# Patient Record
Sex: FEMALE | Race: BLACK OR AFRICAN AMERICAN | HISPANIC OR LATINO | Employment: PART TIME | URBAN - METROPOLITAN AREA
[De-identification: names, ages, dates, MRNs, and addresses within clinical notes are randomized per-mention and may not be internally consistent; named-entity substitution may affect disease eponyms.]

---

## 2017-04-25 ENCOUNTER — ALLSCRIPTS OFFICE VISIT (OUTPATIENT)
Dept: OTHER | Facility: OTHER | Age: 22
End: 2017-04-25

## 2017-04-25 ENCOUNTER — LAB REQUISITION (OUTPATIENT)
Dept: LAB | Facility: HOSPITAL | Age: 22
End: 2017-04-25
Payer: COMMERCIAL

## 2017-04-25 DIAGNOSIS — Z11.3 ENCOUNTER FOR SCREENING FOR INFECTIONS WITH PREDOMINANTLY SEXUAL MODE OF TRANSMISSION: ICD-10-CM

## 2017-04-25 PROCEDURE — 87491 CHLMYD TRACH DNA AMP PROBE: CPT | Performed by: PHYSICIAN ASSISTANT

## 2017-04-25 PROCEDURE — 87591 N.GONORRHOEAE DNA AMP PROB: CPT | Performed by: PHYSICIAN ASSISTANT

## 2017-04-26 LAB
CHLAMYDIA DNA CVX QL NAA+PROBE: NORMAL
N GONORRHOEA DNA GENITAL QL NAA+PROBE: NORMAL

## 2017-05-02 ENCOUNTER — APPOINTMENT (OUTPATIENT)
Dept: PHYSICAL THERAPY | Facility: REHABILITATION | Age: 22
End: 2017-05-02
Payer: COMMERCIAL

## 2017-05-02 PROCEDURE — 97140 MANUAL THERAPY 1/> REGIONS: CPT

## 2017-05-02 PROCEDURE — 97162 PT EVAL MOD COMPLEX 30 MIN: CPT

## 2017-05-10 ENCOUNTER — ALLSCRIPTS OFFICE VISIT (OUTPATIENT)
Dept: OTHER | Facility: OTHER | Age: 22
End: 2017-05-10

## 2017-05-16 ENCOUNTER — APPOINTMENT (OUTPATIENT)
Dept: PHYSICAL THERAPY | Facility: REHABILITATION | Age: 22
End: 2017-05-16
Payer: COMMERCIAL

## 2017-05-18 ENCOUNTER — ALLSCRIPTS OFFICE VISIT (OUTPATIENT)
Dept: OTHER | Facility: OTHER | Age: 22
End: 2017-05-18

## 2017-05-22 ENCOUNTER — APPOINTMENT (OUTPATIENT)
Dept: PHYSICAL THERAPY | Facility: REHABILITATION | Age: 22
End: 2017-05-22
Payer: COMMERCIAL

## 2017-05-23 ENCOUNTER — APPOINTMENT (OUTPATIENT)
Dept: PHYSICAL THERAPY | Facility: REHABILITATION | Age: 22
End: 2017-05-23
Payer: COMMERCIAL

## 2017-05-30 ENCOUNTER — APPOINTMENT (OUTPATIENT)
Dept: PHYSICAL THERAPY | Facility: REHABILITATION | Age: 22
End: 2017-05-30
Payer: COMMERCIAL

## 2017-06-12 DIAGNOSIS — Z00.00 ENCOUNTER FOR GENERAL ADULT MEDICAL EXAMINATION WITHOUT ABNORMAL FINDINGS: ICD-10-CM

## 2017-06-12 DIAGNOSIS — B16.9 ACUTE VIRAL HEPATITIS B WITHOUT COMA AND WITHOUT DELTA AGENT: ICD-10-CM

## 2017-06-13 ENCOUNTER — APPOINTMENT (OUTPATIENT)
Dept: LAB | Facility: HOSPITAL | Age: 22
End: 2017-06-13
Payer: COMMERCIAL

## 2017-06-13 ENCOUNTER — TRANSCRIBE ORDERS (OUTPATIENT)
Dept: LAB | Facility: HOSPITAL | Age: 22
End: 2017-06-13

## 2017-06-13 DIAGNOSIS — Z00.00 ENCOUNTER FOR GENERAL ADULT MEDICAL EXAMINATION WITHOUT ABNORMAL FINDINGS: ICD-10-CM

## 2017-06-13 DIAGNOSIS — B16.9 ACUTE VIRAL HEPATITIS B WITHOUT COMA AND WITHOUT DELTA AGENT: ICD-10-CM

## 2017-06-13 LAB
HAV IGM SER QL: NORMAL
HBV CORE IGM SER QL: NORMAL
HBV SURFACE AG SER QL: NORMAL
HCV AB SER QL: NORMAL

## 2017-06-13 PROCEDURE — 87389 HIV-1 AG W/HIV-1&-2 AB AG IA: CPT

## 2017-06-13 PROCEDURE — 36415 COLL VENOUS BLD VENIPUNCTURE: CPT

## 2017-06-13 PROCEDURE — 80074 ACUTE HEPATITIS PANEL: CPT

## 2017-06-14 LAB — HIV 1+2 AB+HIV1 P24 AG SERPL QL IA: NORMAL

## 2017-07-12 ENCOUNTER — GENERIC CONVERSION - ENCOUNTER (OUTPATIENT)
Dept: OTHER | Facility: OTHER | Age: 22
End: 2017-07-12

## 2017-07-19 DIAGNOSIS — B16.9 ACUTE VIRAL HEPATITIS B WITHOUT COMA AND WITHOUT DELTA AGENT: ICD-10-CM

## 2017-07-19 DIAGNOSIS — Z00.00 ENCOUNTER FOR GENERAL ADULT MEDICAL EXAMINATION WITHOUT ABNORMAL FINDINGS: ICD-10-CM

## 2017-09-08 ENCOUNTER — APPOINTMENT (OUTPATIENT)
Dept: PHYSICAL THERAPY | Facility: REHABILITATION | Age: 22
End: 2017-09-08
Payer: COMMERCIAL

## 2017-09-08 PROCEDURE — 97162 PT EVAL MOD COMPLEX 30 MIN: CPT

## 2017-09-14 ENCOUNTER — APPOINTMENT (OUTPATIENT)
Dept: PHYSICAL THERAPY | Facility: REHABILITATION | Age: 22
End: 2017-09-14
Payer: COMMERCIAL

## 2017-09-14 PROCEDURE — 97112 NEUROMUSCULAR REEDUCATION: CPT

## 2017-09-14 PROCEDURE — 97014 ELECTRIC STIMULATION THERAPY: CPT

## 2017-09-14 PROCEDURE — 97140 MANUAL THERAPY 1/> REGIONS: CPT

## 2017-09-15 ENCOUNTER — APPOINTMENT (OUTPATIENT)
Dept: PHYSICAL THERAPY | Facility: REHABILITATION | Age: 22
End: 2017-09-15
Payer: COMMERCIAL

## 2017-09-15 PROCEDURE — 97110 THERAPEUTIC EXERCISES: CPT

## 2017-09-15 PROCEDURE — 97014 ELECTRIC STIMULATION THERAPY: CPT

## 2017-09-15 PROCEDURE — 97112 NEUROMUSCULAR REEDUCATION: CPT

## 2017-09-15 PROCEDURE — 97140 MANUAL THERAPY 1/> REGIONS: CPT

## 2017-09-21 ENCOUNTER — APPOINTMENT (OUTPATIENT)
Dept: PHYSICAL THERAPY | Facility: REHABILITATION | Age: 22
End: 2017-09-21
Payer: COMMERCIAL

## 2017-09-21 PROCEDURE — 97112 NEUROMUSCULAR REEDUCATION: CPT

## 2017-09-21 PROCEDURE — 97140 MANUAL THERAPY 1/> REGIONS: CPT

## 2017-09-21 PROCEDURE — 97014 ELECTRIC STIMULATION THERAPY: CPT

## 2017-09-22 ENCOUNTER — APPOINTMENT (OUTPATIENT)
Dept: PHYSICAL THERAPY | Facility: REHABILITATION | Age: 22
End: 2017-09-22
Payer: COMMERCIAL

## 2017-09-28 ENCOUNTER — APPOINTMENT (OUTPATIENT)
Dept: PHYSICAL THERAPY | Facility: REHABILITATION | Age: 22
End: 2017-09-28
Payer: COMMERCIAL

## 2017-09-28 PROCEDURE — 97140 MANUAL THERAPY 1/> REGIONS: CPT

## 2017-09-28 PROCEDURE — 97110 THERAPEUTIC EXERCISES: CPT

## 2017-09-28 PROCEDURE — 97014 ELECTRIC STIMULATION THERAPY: CPT

## 2017-10-06 ENCOUNTER — APPOINTMENT (OUTPATIENT)
Dept: PHYSICAL THERAPY | Facility: REHABILITATION | Age: 22
End: 2017-10-06
Payer: COMMERCIAL

## 2017-10-06 PROCEDURE — 97014 ELECTRIC STIMULATION THERAPY: CPT

## 2017-10-06 PROCEDURE — 97140 MANUAL THERAPY 1/> REGIONS: CPT

## 2017-10-06 PROCEDURE — 97110 THERAPEUTIC EXERCISES: CPT

## 2017-10-06 PROCEDURE — 97112 NEUROMUSCULAR REEDUCATION: CPT

## 2017-10-13 ENCOUNTER — APPOINTMENT (OUTPATIENT)
Dept: PHYSICAL THERAPY | Facility: REHABILITATION | Age: 22
End: 2017-10-13
Payer: COMMERCIAL

## 2017-10-13 PROCEDURE — 97110 THERAPEUTIC EXERCISES: CPT

## 2017-10-13 PROCEDURE — 97014 ELECTRIC STIMULATION THERAPY: CPT

## 2017-10-13 PROCEDURE — 97140 MANUAL THERAPY 1/> REGIONS: CPT

## 2017-10-20 ENCOUNTER — APPOINTMENT (OUTPATIENT)
Dept: PHYSICAL THERAPY | Facility: REHABILITATION | Age: 22
End: 2017-10-20
Payer: COMMERCIAL

## 2017-10-20 PROCEDURE — G8990 OTHER PT/OT CURRENT STATUS: HCPCS

## 2017-10-20 PROCEDURE — 97110 THERAPEUTIC EXERCISES: CPT

## 2017-10-20 PROCEDURE — G8991 OTHER PT/OT GOAL STATUS: HCPCS

## 2017-12-06 ENCOUNTER — GENERIC CONVERSION - ENCOUNTER (OUTPATIENT)
Dept: OTHER | Facility: OTHER | Age: 22
End: 2017-12-06

## 2018-01-11 NOTE — MISCELLANEOUS
Message  pt called stating " i woke up with sam hics cxtns last night -had 3 in 30 minutes-none after that & none now" encouraged pt to increase water intake- advised to drink 6-8 glasses/water daily  she also has GI viral symptoms- ie: diarrhea pt advised BRAT diet today & reasses symptoms after 24 hrs- gradually increase diet as tolerated  -denies LOF, vag blding, states + FM      Active Problems    1  Environmental allergies (V15 09) (Z91 09)   2  Need for diphtheria-tetanus-pertussis (Tdap) vaccine (V06 1) (Z23)   3  Need for immunization against influenza (V04 81) (Z23)   4  Obesity during pregnancy in second trimester (649 13,278 00) (H78 340)   5  Pregnancy (V22 2) (Z33 1)   6  Pregnancy, supervision of first, second trimester (V22 0) (Z34 02)   7  Johanne-Parkinson-White syndrome (426 7) (I45 6)    Current Meds   1  Coconut Oil OIL; Therapy: (Recorded:11Mar2016) to Recorded   2  Prenatal 27-0 8 MG Oral Tablet; TAKE 1 TABLET DAILY AS DIRECTED; Therapy: 16Uqb2645 to (Evaluate:48Fun2895); Last Rx:55Idj8192 Ordered   3  Tdap (Boostrix) (Tdap (Boostrix)); INJECT 0 5 ML Once; Therapy: 77YJO5110 to (Last Rx:38Gdr7538) Ordered    Allergies    1  No Known Drug Allergies    2  No Known Environmental Allergies   3   No Known Food Allergies    Signatures   Electronically signed by : Breanna Huston RN; Mar 18 2016 11:59AM EST                       (Author)

## 2018-01-11 NOTE — MISCELLANEOUS
Message  telephone call from patient  States she has not felt baby move this morning--only 1 kick  Instructed to go L&D immediately      Active Problems    1  Bacterial vaginosis (616 10,041 9) (N76 0,B96 89)   2  Environmental allergies (V15 09) (Z91 09)   3  Maternal arrhythmia affecting pregnancy in third trimester, antepartum (441 56,737 2)   (O99 413,I49 9)   4  Need for diphtheria-tetanus-pertussis (Tdap) vaccine (V06 1) (Z23)   5  Need for immunization against influenza (V04 81) (Z23)   6  Obesity during pregnancy in second trimester (649 13,278 00) (S29 120)   7  Palpitations (785 1) (R00 2)   8  Pregnancy (V22 2) (Z33 1)   9  Pregnancy, supervision of first, second trimester (V22 0) (Z34 02)   10  Proteinuria (791 0) (R80 9)   11  Johanne-Parkinson-White syndrome (426 7) (I45 6)    Current Meds   1  Coconut Oil OIL; Therapy: (Recorded:11Mar2016) to Recorded   2  Magnesium Oxide 400 MG Oral Tablet; TAKE 1 TABLET DAILY; Therapy: 89SFG7448 to (Evaluate:41Gop1494)  Requested for: 28Mar2016; Last   Rx:00Urc9687 Ordered   3  Metoprolol Succinate ER 25 MG Oral Tablet Extended Release 24 Hour; Take 1 tablet   daily; Therapy: 91Cnt7361 to (Evaluate:22Did5215)  Requested for: 80Xob5904; Last   Rx:63Lxw9602 Ordered   4  Prenatal 27-0 8 MG Oral Tablet; TAKE 1 TABLET DAILY AS DIRECTED; Therapy: 34Cda1688 to (Evaluate:56Ghx2561); Last Rx:28Ysd6735 Ordered    Allergies    1  No Known Drug Allergies    2  No Known Environmental Allergies   3   No Known Food Allergies    Signatures   Electronically signed by : Jamir Murray RN; May  5 2016 12:28PM EST                       (Author)

## 2018-01-13 VITALS
SYSTOLIC BLOOD PRESSURE: 132 MMHG | DIASTOLIC BLOOD PRESSURE: 80 MMHG | WEIGHT: 159 LBS | BODY MASS INDEX: 27.14 KG/M2 | HEIGHT: 64 IN

## 2018-01-13 VITALS
HEART RATE: 72 BPM | DIASTOLIC BLOOD PRESSURE: 68 MMHG | TEMPERATURE: 98 F | BODY MASS INDEX: 27.06 KG/M2 | SYSTOLIC BLOOD PRESSURE: 110 MMHG | HEIGHT: 64 IN | WEIGHT: 158.51 LBS

## 2018-01-13 NOTE — MISCELLANEOUS
Message  Telephone call from patient  States that condom broke last night during intercourse  Requesting PlanB  Rx  sent with instructions to take ASAP      Active Problems    1  Bacterial vaginosis (616 10,041 9) (N76 0,B96 89)   2  Contraception management (V25 9) (Z30 9)   3  Environmental allergies (V15 09) (Z91 09)   4  Palpitations (785 1) (R00 2)   5  Pelvic pain in female (625 9) (R10 2)   6  Proteinuria (791 0) (R80 9)   7  Unprotected sexual intercourse (V69 2) (Z72 51)   8  Johanne-Parkinson-White syndrome (426 7) (I45 6)    Current Meds   1  Coconut Oil OIL; Therapy: (Recorded:11Mar2016) to Recorded   2  MetroNIDAZOLE 500 MG Oral Tablet; TAKE 1 TABLET TWICE DAILY UNTIL FINISHED; Therapy: 71GWP2003 to (Evaluate:12Qjt6104)  Requested for: 17Aug2016; Last   Rx:82Yzr0562 Ordered   3  Prenatal 27-0 8 MG Oral Tablet; TAKE 1 TABLET DAILY AS DIRECTED; Therapy: 62Zaj0033 to (Evaluate:04Gvh7450); Last Rx:58Qis4382 Ordered    Allergies    1  No Known Drug Allergies    2  No Known Environmental Allergies   3   No Known Food Allergies    Plan  SocHx: Unprotected sexual intercourse    · Levonorgestrel 1 5 MG Oral Tablet (Plan B One-Step); TAKE 1 TABLET ONCE    Signatures   Electronically signed by : Haley Rankin RN; Sep  2 2016 10:05AM EST                       (Author)

## 2018-01-13 NOTE — PROGRESS NOTES
Assessment    1  Pelvic pain in female (625 9) (R10 2)   2  Bacterial vaginosis (616 10,041 9) (N76 0,B96 89)   3  Contraception management (V25 9) (Z30 9)    Plan  Bacterial vaginosis    · MetroNIDAZOLE 500 MG Oral Tablet; TAKE 1 TABLET TWICE DAILY UNTIL  FINISHED   Rx By: Bessie Jensen; Dispense: 7 Days ; #:14 Tablet; Refill: 1; For: Bacterial vaginosis; EMILY = N; Verified Transmission to [place] [address] ; Last Updated By: System, [employer]; 2016 9:29:10 AM   · Marcelino Azraraudel Mount- POC; Status:Complete;   Done: 97QIE2725 09:28AM   Performed: In Office; YSS:90OWG8359;YZEDKDN; Today; For:Bacterial vaginosis; Ordered By:Eloina Perez;  Pelvic pain in female    · (1) CHLAMYDIA/GC AMPLIFIED DNA, PCR; Source:Endocervical; Status:Active; Requested for:02Uvg9760;    Perform:St  [address] [place] [address]; [place]; For:Pelvic pain in female; Ordered By:Fredis Perez;    Discussion/Summary  Discussion Summary:   BV: Take medication as ordered  No sex prior to follow up in one wk  If pain persists consider PID treatment; not treating today as PID due to mild symptoms and few WBC on wet mount  Call office if pain worsens  GC/CT culture done  Contraception options discussed  Pt  considering Paragard again; advised pelvic would need to be resolved prior to IUD insertion  Also advised unlikely that Mirena would cause increased cramping like the Paragard did  Further discuss Paragard at one wk  follow up  Chief Complaint  Chief Complaint Free Text Note Form: Patient is here for painful intercourse and lower pelvic pain  Patient states it started this month during her ovulation cycle  Pain is not constant  No odor or discharge  History of Present Illness  HPI: 24 yo  presents with new onset pelvic pain   16  LMP 2016, using condoms  Used Paragard in past for 6 yr  and had increased cramping but not bleeding  No longer breast feeding     Reports 5-6 days intermittent pelvic pain, lasts < hr , no trigger or alleviating, has not tried pain medication, 6 on 10 scale, crampy/ache pain  Also new onset dyspareunia  Pain location varies, but all lower abdominal/pelvic  No voiding or bowel changes, no n/v/f/c  No recent strenuous activity  Current partner for over a year  Active Problems    1  Encounter for postpartum assessment (V24 2) (Z39 2)   2  Environmental allergies (V15 09) (Z91 09)   3  Maternal arrhythmia affecting pregnancy in third trimester, antepartum (586 86,980 8)   (O99 413,I49 9)   4  Need for diphtheria-tetanus-pertussis (Tdap) vaccine (V06 1) (Z23)   5  Need for immunization against influenza (V04 81) (Z23)   6  Obesity during pregnancy in second trimester (649 13,278 00) (S86 103)   7  Palpitations (785 1) (R00 2)   8  Pregnancy (V22 2) (Z33 1)   9  Pregnancy, supervision of first, second trimester (V22 0) (Z34 02)   10  Proteinuria (791 0) (R80 9)   11  Johanne-Parkinson-White syndrome (426 7) (I45 6)    Past Medical History    1  History of Abdominal pain, RLQ (right lower quadrant) (789 03) (R10 31)   2  History of Acute PID (pelvic inflammatory disease) (614 3) (N73 0)   3  History of Attention Deficit Disorder Without Hyperactivity (314 00)   4  History of Bacterial vaginosis (616 10,041 9) (N76 0,B96 89)   5  History of allergic reaction (V15 09) (Z88 9)   6  History of dizziness (V13 89) (Z87 898)   7  History of fatigue (V13 89) (Z87 898)   8  History of upper respiratory infection (V12 09) (Z87 09)   9  History of urinary tract infection (V13 02) (Z87 440)   10  History of Injury, hand (959 4) (S69 90XA)   11  History of Screening for chlamydial disease (V73 98) (Z11 8)   12  History of Screening for gonorrhea (V74 5) (Z11 3)   13  History of Skin rash (782 1) (R21)   14  History of Strain of thoracic region (847 1) (S29 019A)   15  History of Thoracic back pain (724 1) (M54 6)   16   History of Trapezius muscle spasm (726 85) (O84 556)   17  History of Vitamin D deficiency (268 9) (E55 9)    Surgical History    1  History of Hand Surgery    Family History  Mother    1  Family history of Depression   2  Family history of Diabetes Mellitus (V18 0)   3  Family history of HIV Infection   4  Family history of Nephrolithiasis   5  Family history of Thyroid Disorder (V18 19)  Brother    6  Family history of Diabetes Mellitus (V18 0)   7  Family history of Diabetic Peripheral Neuropathy Type 2 - Uncontrolled  Grandmother    8  Family history of CAD (coronary artery disease)    Social History    · Denied: History of Alcohol Use (History)   · Former smoker (V15 82) (T43 764)   · History of Former smoker (V15 82) (Z87 891)   · Home Environment (V60 9)   · Marital History - Single   · Not currently sexually active   · History of Using Marijuana    Current Meds   1  Coconut Oil OIL; Therapy: (Recorded:11Mar2016) to Recorded   2  Prenatal 27-0 8 MG Oral Tablet; TAKE 1 TABLET DAILY AS DIRECTED; Therapy: 98Kxf9192 to (Evaluate:84Hno8909); Last Rx:03Sep2015 Ordered    Allergies    1  No Known Drug Allergies    2  No Known Environmental Allergies   3  No Known Food Allergies    Vitals  Vital Signs    Recorded: 62CAB4989 00:28QP   Systolic 161   Diastolic 77   Height 5 ft 4 in   Weight 173 lb    BMI Calculated 29 7   BSA Calculated 1 84     Physical Exam    Constitutional   General appearance: No acute distress, well appearing and well nourished  Genitourinary   External genitalia: Normal and no lesions appreciated  Vagina: Abnormal   (moderate, odorous white discharge)   Cervix: Abnormal   (very mild CMT)   Uterus: Abnormal   (mild tenderness with palpation) The uterus was tender, but in a normal position  Adnexa/parametria: Abnormal   (mild tenderness, L>R) Adnexa Findings: there were no adnexal masses        Results/Data  [facility] 55SPA7089 09:28AM Haider Mean   few wbc     Test Name Result Flag Reference   BACTERIA moderate CLUE CELL moderate     TRICHOMONAS 0     YEAST WITH HYPHAE 0     YEAST 0     PH UA 5 0     KOH PREP      pos  whiff, no hyphae       Future Appointments    Date/Time Provider Specialty Site   08/24/2016 09:45 AM [address] [facility], [facility]  [facility] [address]     Signatures   Electronically signed by : Kym Coy;  Aug 17 2016 11:19AM EST                       (Author)    Electronically signed by : Mortimer Mathieu, MD; Sep  8 2016  9:45AM EST

## 2018-01-14 VITALS
WEIGHT: 156 LBS | BODY MASS INDEX: 26.63 KG/M2 | DIASTOLIC BLOOD PRESSURE: 80 MMHG | HEIGHT: 64 IN | SYSTOLIC BLOOD PRESSURE: 122 MMHG

## 2018-01-15 NOTE — PROGRESS NOTES
2016         RE: [place]                                  To: [facility]   MR#: [id number]                                    [address] [place] [address]   : [address] [facility] [address]   ENC: [id number]                             Fax: [phone]   (Exam #: [id number])      The LMP of this 23year old,  1, para 0 patient was AUG 2 2015,   giving her an ARISTEO of MAY 8 2016 and a current gestational age of 27 weeks   5 days by dates  A sonographic examination was performed on 2016   using real time equipment  The ultrasound examination was performed using   abdominal technique  The patient has a BMI of 33 5  Her blood pressure   today was 113/66  With the exception of occasional palpitations, Reji Werner has no complaints   today  She reports regular fetal movements and denies problems related to   hypertension, gestational diabetes,  labor, or vaginal bleeding  Cardiac motion was observed at 141 bpm       INDICATIONS      Interval growth assesment   obesity   maternal WPW      Exam Types      Level I      RESULTS      Fetus # 1 of 1   Vertex presentation   Fetal growth appeared normal   Placenta Location = Anterior   No placenta previa   Placenta Grade = I      MEASUREMENTS (* Included In Average GA)      AC              29 7 cm        33 weeks 5 days* (19%)   BPD              8 9 cm        36 weeks 1 day * (57%)   HC              31 7 cm        35 weeks 1 day * (29%)   Femur            6 6 cm        33 weeks 6 days* (27%)      HC/AC           1 07   FL/AC           0 22   FL/BPD          0 74   Ceph Index      0 82   EFW (Ac/Fl/Hc)  2348 grams - 5 lbs 3 oz                 (19%)      THE AVERAGE GESTATIONAL AGE is 34 weeks 5 days +/- 21 days        AMNIOTIC FLUID      Q1: 4 6      Q2: 1 2      Q3: 0 2      Q4: 2 2   DARNELL Total = 8 2 cm   Amniotic Fluid: Normal      BIOPHYSICAL PROFILE      The Biophysical Profile score was 8/8  Breathin  Movement: 2  Tone: 2  AFV: 2      IMPRESSION      Stringer IUP   34 weeks and 5 days by this ultrasound  (ARISTEO=MAY 15 2016)   Vertex presentation   Fetal growth appeared normal   Regular fetal heart rate of 141 bpm   Anterior placenta   No placenta previa      GENERAL COMMENT      No fetal structural abnormality is identified on the Level I survey today  The fetal brain, heart including four-chamber, 3 vessel tracheal, and   outflow tract views, stomach, kidneys, bladder, three vessel cord, and   diaphragm appear normal   Fetal interval growth and amniotic fluid volume   are normal       Today's ultrasound findings and suggested follow-up were discussed in   detail with Maxim  Daily third trimester fetal kick counting was   discussed at the visit today  No further appointment has been scheduled in   the [facility]  for [facility] at this time  Follow-up [facility] ultrasound   evaluation is recommended as clinically indicated  The face to face time, in addition to time spent discussing ultrasound   results, was 10 minutes, greater than 50% of which was spent during   counseling and coordination of care  AYESHA Gastelum M D     Maternal-Fetal Medicine   Electronically signed 16 09:33

## 2018-01-15 NOTE — MISCELLANEOUS
Message  Telephone call from patient  C/O increase in vaginal discharge,denies itching or odor  Instructed to wait/watch call back if odor itching, etc   Also c/o "strong contraction" last night  Denies juanita presently  Denies bleeding or loss of fluid  States normal fetal movement  Instructed to increase fluid intake  Pelvic rest  PTL precautions reviewed  To call back with any changes, questions or concerns      Active Problems    1  Environmental allergies (V15 09) (Z91 09)   2  Need for diphtheria-tetanus-pertussis (Tdap) vaccine (V06 1) (Z23)   3  Need for immunization against influenza (V04 81) (Z23)   4  Obesity during pregnancy in second trimester (649 13,278 00) (Q69 304)   5  Pregnancy (V22 2) (Z33 1)   6  Pregnancy, supervision of first, second trimester (V22 0) (Z34 02)   7  Johanne-Parkinson-White syndrome (426 7) (I45 6)    Current Meds   1  Coconut Oil OIL; Therapy: (Recorded:11Mar2016) to Recorded   2  Prenatal 27-0 8 MG Oral Tablet; TAKE 1 TABLET DAILY AS DIRECTED; Therapy: 62Hwu4691 to (Evaluate:99Squ6983); Last Rx:25Enr2025 Ordered   3  Tdap (Boostrix) (Tdap (Boostrix)); INJECT 0 5 ML Once; Therapy: 13TXY9617 to (Last Rx:48Fsn4849) Ordered    Allergies    1  No Known Drug Allergies    2  No Known Environmental Allergies   3   No Known Food Allergies    Signatures   Electronically signed by : Butch Chaudhary RN; Mar 16 2016 12:59PM EST                       (Author)

## 2018-01-16 NOTE — PROGRESS NOTES
MAR 11 2016         RE: Kailey Segovia                                  To: [facility]   MR#: [id number]                                    [address] [facility]   : [address] [facility], [address]   ENC: [id number]                             Fax: [phone]   (Exam #: [id number])      The LMP of this 23year old,  1, para 0 patient was AUG 2 2015,   giving her an ARISTEO of MAY 8 2016 and a current gestational age of 34 weeks   5 days by dates  A sonographic examination was performed on MAR 11 2016   using real time equipment  The ultrasound examination was performed using   abdominal technique  The patient has a BMI of 32 8  Her blood pressure   today was 133/65  Cardiac motion was observed at 146 bpm       INDICATIONS      Interval growth assesment   obesity   maternal medical condition unspecified      Exam Types      Level I      RESULTS      Fetus # 1 of 1   Vertex presentation   Fetal growth appeared normal   Placenta Location = Anterior   No placenta previa   Placenta Grade = II      MEASUREMENTS (* Included In Average GA)      AC              25 3 cm        29 weeks 4 days* (9%)   BPD              8 2 cm        32 weeks 6 days* (64%)   HC              28 7 cm        31 weeks 1 day * (23%)   Femur            5 9 cm        30 weeks 4 days* (29%)      Cerebellum       4 1 cm        34 weeks 0 days      HC/AC           1 13   FL/AC           0 23   FL/BPD          0 72   EFW (Ac/Fl/Hc)  1528 grams - 3 lbs 6 oz                 (25%)      THE AVERAGE GESTATIONAL AGE is 31 weeks 0 days +/- 18 days        AMNIOTIC FLUID      Q1: 3 5      Q2: 5 1      Q3: 2 7      Q4: 3 4   DARNELL Total = 14 7 cm   Amniotic Fluid: Normal      FETAL VESSELS                                     S/D   PI    RI    PSV   AEDV RF                                                    cm/s       Umbilical Artery           2 03  0 70  0 51        No   No       Middle Cerebral Artery     8 79  2 36  0 89        No      ANATOMY DETAILS      Visualized Appearing Sonographically Normal:   HEAD: (Calvarium, BPD Level, Lateral Ventricles, Choroid Plexus,   Cerebellum); HEART: (Four Chamber View, Proximal Left Outflow, Proximal   Right Outflow, Cardiac Axis, Interventricular Septum, Interatrial Septum,   Cardiac Position);    STOMACH, RIGHT KIDNEY, LEFT KIDNEY, BLADDER, PLACENTA      Suboptimally Visualized:   HEAD: (Cisterna Magna)      BIOPHYSICAL PROFILE      The Biophysical Profile score was 8/8  Breathin  Movement: 2  Tone: 2  AFV: 2      IMPRESSION      Stringer IUP   31 weeks and 0 days by this ultrasound  (ARISTEO=MAY 13 2016)   Vertex presentation   Fetal growth appeared normal   Regular fetal heart rate of 146 bpm   Anterior placenta   No placenta previa      GENERAL COMMENT      On exam today the patient appears well, in no acute distress, and denies   any complaints  Her abdomen is non-tender  There has been appropriate interval fetal growth  Good fetal movement and   tone are seen  The amniotic fluid volume appears normal   The placenta is   anterior and it appears sonographically normal   Fetal Dopplers are normal   for gestational age  The anatomic structures listed above could not be   optimally imaged today because of fetal position; however, these   structures were seen on a prior ultrasound and appeared sonographically   normal   The patient was informed of today's findings and all of her   questions were answered  The limitations of ultrasound were reviewed with   the patient, which she accepts   labor precautions were discussed   and the patient was encouraged to contact her Obstetrician should she   experience any signs or symptoms of  labor  Fetal movement   assessment was reviewed with the patient  The patient verbalized her   understanding of these instructions  Recommend further ultrasounds as clinically indicated  Please note, in addition to the time spent discussing the results of the   ultrasound, I spent approximately 10 minutes of face-to-face time with the   patient, greater than 50% of which was spent in counseling and the   coordination of care for this patient  Thank you very much for allowing us to participate in the care of this   very nice patient  Should you have any questions, please do not hesitate   to contact our office  AYESHA Us , AZAR Costello     Electronically signed 03/19/16 08:09

## 2018-01-16 NOTE — MISCELLANEOUS
Message  returned call to patient  C/O one episode of left- sided abdominal pain radiating to back and leg 2 days prior  None since and currently denies pain  States fetal movement is normal, denies bleeding, loss of fluid or dysuria  Instructed to wait/watch  Increase fluids  Call back if continues or change in any of the above       Active Problems    1  Environmental allergies (V15 09) (Z91 09)   2  Need for diphtheria-tetanus-pertussis (Tdap) vaccine (V06 1) (Z23)   3  Need for immunization against influenza (V04 81) (Z23)   4  Obesity during pregnancy in second trimester (649 13,278 00) (O99 212,E66 9)   5  Pregnancy (V22 2) (Z33 1)   6  Pregnancy, supervision of first, second trimester (V22 0) (Z34 02)   7  Johanne-Parkinson-White syndrome (426 7) (I45 6)    Current Meds   1  Prenatal 27-0 8 MG Oral Tablet; TAKE 1 TABLET DAILY AS DIRECTED; Therapy: 89Aox9233 to (Evaluate:08Ugz5317); Last Rx:36Cgb6779 Ordered   2  Tdap (Boostrix) (Tdap (Boostrix)); INJECT 0 5 ML Once; Therapy: 89JNA4095 to (Last Rx:81Axg6911) Ordered    Allergies    1  No Known Drug Allergies    2  No Known Environmental Allergies   3   No Known Food Allergies    Signatures   Electronically signed by : Ajith Bhatt RN; Feb 11 2016  9:09AM EST                       (Author)

## 2018-01-17 NOTE — MISCELLANEOUS
Provider Comments  Provider Comments:   pt n/s for her paraguard f/up      Signatures   Electronically signed by : Meal Mantramontserrat Alanis, ; Apr 5 2017 10:02AM EST                       (Author)

## 2018-01-17 NOTE — MISCELLANEOUS
Message  telephone call from patient  C/O contractions every 3-5 minutes lasting for several hours yesterday  Stopped then started again this morning  Also c/o slight pink vaginal discharge  Appointment given for [id number] today to evaluate      Active Problems    1  Bacterial vaginosis (616 10,041 9) (N76 0,B96 89)   2  Environmental allergies (V15 09) (Z91 09)   3  Need for diphtheria-tetanus-pertussis (Tdap) vaccine (V06 1) (Z23)   4  Need for immunization against influenza (V04 81) (Z23)   5  Obesity during pregnancy in second trimester (649 13,278 00) (F70 017)   6  Pregnancy (V22 2) (Z33 1)   7  Pregnancy, supervision of first, second trimester (V22 0) (Z34 02)   8  Johanne-Parkinson-White syndrome (426 7) (I45 6)    Current Meds   1  Coconut Oil OIL; Therapy: (Recorded:11Mar2016) to Recorded   2  Magnesium Oxide 400 MG Oral Tablet; TAKE 1 TABLET DAILY; Therapy: 18BZC0073 to (Evaluate:03Ccu3988)  Requested for: 28Mar2016; Last   Rx:28Mar2016 Ordered   3  Prenatal 27-0 8 MG Oral Tablet; TAKE 1 TABLET DAILY AS DIRECTED; Therapy: 82Eak9107 to (Evaluate:73Ouq0978); Last Rx:45Oed0546 Ordered   4  Probiotic Oral Capsule; USE AS DIRECTED; Therapy: 38HCR6089 to Recorded    Allergies    1  No Known Drug Allergies    2  No Known Environmental Allergies   3   No Known Food Allergies    Signatures   Electronically signed by : Carri Nixon RN; Mar 29 2016 11:28AM EST                       (Author)

## 2018-07-05 ENCOUNTER — APPOINTMENT (OUTPATIENT)
Dept: URGENT CARE | Age: 23
End: 2018-07-05
Payer: OTHER MISCELLANEOUS

## 2018-07-05 PROCEDURE — G0383 LEV 4 HOSP TYPE B ED VISIT: HCPCS | Performed by: PREVENTIVE MEDICINE

## 2018-07-05 PROCEDURE — 99284 EMERGENCY DEPT VISIT MOD MDM: CPT | Performed by: PREVENTIVE MEDICINE

## 2018-07-10 ENCOUNTER — APPOINTMENT (OUTPATIENT)
Dept: URGENT CARE | Age: 23
End: 2018-07-10
Payer: OTHER MISCELLANEOUS

## 2018-07-10 PROCEDURE — 99213 OFFICE O/P EST LOW 20 MIN: CPT | Performed by: PREVENTIVE MEDICINE

## 2019-02-21 ENCOUNTER — OFFICE VISIT (OUTPATIENT)
Dept: OBGYN CLINIC | Facility: CLINIC | Age: 24
End: 2019-02-21

## 2019-02-21 VITALS
SYSTOLIC BLOOD PRESSURE: 140 MMHG | HEIGHT: 64 IN | DIASTOLIC BLOOD PRESSURE: 63 MMHG | BODY MASS INDEX: 30.39 KG/M2 | WEIGHT: 178 LBS | HEART RATE: 100 BPM

## 2019-02-21 DIAGNOSIS — B96.89 BV (BACTERIAL VAGINOSIS): ICD-10-CM

## 2019-02-21 DIAGNOSIS — Z97.5 IUD CONTRACEPTION: Primary | ICD-10-CM

## 2019-02-21 DIAGNOSIS — N76.0 BV (BACTERIAL VAGINOSIS): ICD-10-CM

## 2019-02-21 PROCEDURE — 87210 SMEAR WET MOUNT SALINE/INK: CPT | Performed by: NURSE PRACTITIONER

## 2019-02-21 PROCEDURE — 99212 OFFICE O/P EST SF 10 MIN: CPT | Performed by: NURSE PRACTITIONER

## 2019-02-21 RX ORDER — METRONIDAZOLE 500 MG/1
500 TABLET ORAL EVERY 12 HOURS SCHEDULED
Qty: 14 TABLET | Refills: 0 | Status: SHIPPED | OUTPATIENT
Start: 2019-02-21 | End: 2019-02-28

## 2019-02-21 RX ORDER — COPPER 313.4 MG/1
1 INTRAUTERINE DEVICE INTRAUTERINE CONTINUOUS
COMMUNITY
End: 2020-04-29

## 2019-02-21 NOTE — PATIENT INSTRUCTIONS
Intrauterine Device   WHAT YOU NEED TO KNOW:   What is an intrauterine device? An intrauterine device (IUD) is a type of birth control that is inserted into your uterus  It is a small, flexible piece of plastic with a string on the end  It is inserted and removed by your healthcare provider  IUDs prevent sperm from reaching or fertilizing an egg  IUDs also prevent a fertilized egg from attaching to the uterus and developing into a fetus  What are the most common types of IUDs? · Copper: This type of IUD slowly releases a small amount of copper into your uterus  This IUD can remain in place for up to 10 years  · Hormone-releasing: This type of IUD slowly releases a small amount of progesterone into your uterus  Progesterone is a hormone that is made by your body to help control your periods  This IUD can remain in place for up to 5 years  What are the advantages of an IUD? · Effective: An IUD is 98% to 99% effective in preventing pregnancy  · Easily removable: The IUD can be removed if you decide to have a baby  You may be able to get pregnant as soon as the IUD is removed  · Immediate:  An IUD protects you from pregnancy right after it is inserted  · Convenient:  You do not have to stop sexual activity to insert it or worry about remembering to take your birth control pill  · Safe:  Copper IUDs are safer for some women than oral birth control pills  Examples include women who smoke or have a history of blood clots  · Health effects:  Hormone-releasing IUDs may decrease certain health problems  Examples include bleeding and cramping that happen with your monthly period  What are the risks of an IUD? · An IUD does not protect you from sexually transmitted infections  You may have cramps during the first weeks after you get the IUD  A copper IUD may cause your monthly period to be heavier or more painful  This is more common within the first 3 months after you get the IUD   You may need to have your IUD removed if your bleeding or pain becomes severe  You may have spotting between periods  · There is a small chance that you could get pregnant  Sometimes the IUD cannot be removed after you get pregnant  This increases your risk of a miscarriage or an ectopic pregnancy  Ectopic pregnancy is when the fertilized egg starts to grow somewhere other than your uterus  There is a small risk of an infection within the first 20 days after the IUD is placed  Infection can lead to pelvic inflammatory disease  This can cause infertility  Your uterus may tear when the IUD is inserted  The IUD may slip part or all of the way out of your uterus  How is the IUD inserted? · The IUD is usually inserted during your monthly period  This may help decrease the amount of discomfort you have during the procedure  It also helps ensure that you are not pregnant  You will be asked to lie down and place your feet in stirrups  Your healthcare provider will gently insert a speculum into your vagina  This is the same tool used during a pap smear  The speculum allows your healthcare provider to see inside your vagina to your cervix  The cervix is the opening of your uterus  · Your healthcare provider will clean your cervix with an antiseptic solution to prevent infection  You may be given numbing medicine  A long plastic tube is gently passed through your cervix and into your uterus  This tube has the IUD inside of it  The IUD is pushed out of the tube and into your uterus  You may have cramps as the IUD is inserted  The tube is removed after the IUD is in place  How can I make sure my IUD is still in place? An IUD has a string made of plastic thread  One to 2 inches of this string hangs into your vagina  You cannot see this string, and it will not cause problems when you have sex  Check your IUD string every 3 days for the first 3 months after it is inserted  After that, check the string after each monthly period   Do the following to check the placement of your IUD:  · Wash your hands with soap and warm water  Dry them with a clean towel  · Bend your knees and squat low to the ground  · Gently put your index finger inside your vagina  The cervix is at the top of the vagina and feels like the tip of your nose  Feel for the IUD string  Do not pull on the string  You should not be able to feel the firm plastic of the IUD itself  · Wash your hands after you check your IUD string  Where can I find more information? · [facility] [address] , [address] [place]  Phone: [phone] 749 - [phone]  Web Address: https://[employer]/  org  When should I contact my healthcare provider? · You think you are pregnant  · You bleed after you have sex  · You have pain during sex  · You cannot feel the IUD string, the string feels longer, or you feel the plastic of the IUD itself  · You have vaginal discharge that is green, yellow, or has a foul odor  · You have questions or concerns about your condition or care  When should I seek immediate care? · You have severe pain or bleeding during your period  · You have a fever and severe abdominal pain  CARE AGREEMENT:   You have the right to help plan your care  Learn about your health condition and how it may be treated  Discuss treatment options with your caregivers to decide what care you want to receive  You always have the right to refuse treatment  The above information is an  only  It is not intended as medical advice for individual conditions or treatments  Talk to your doctor, nurse or pharmacist before following any medical regimen to see if it is safe and effective for you  © 2017 [employer] Information is for End User's use only and may not be sold, redistributed or otherwise used for commercial purposes   All illustrations and images included in CareNotes® are the copyrighted property of [employer]  or Iain Lebron  Bacterial Vaginosis   WHAT YOU NEED TO KNOW:   What is bacterial vaginosis? Bacterial vaginosis (BV) is an infection in the vagina  It may cause vaginitis, which is irritation and inflammation of the vagina  What causes bacterial vaginosis? The cause of BV is not known  With BV, there is an imbalance in bacteria normally found in the vagina  Your risk for BV increases if you are sexually active  Your risk for BV also increases if you douche or have an intrauterine device (IUD)  What are the signs and symptoms of bacterial vaginosis? Some women have no symptoms  You may have the following:  · White, gray, or yellow vaginal discharge    · Vaginal discharge that smells like fish    · Itching or burning around the outside of your vagina  How is bacterial vaginosis diagnosed? Your healthcare provider will examine you and ask if you have other health conditions  He may need to take a sample of fluid from your vagina  This will be tested for the bacteria that causes BV  How is bacterial vaginosis treated? Antibiotics are given to kill the bacteria that cause BV  They may be given as a pill or as a cream to put in your vagina  Take or use as directed  What are the risks of bacterial vaginosis? If untreated, BV may spread and lead to serious infections in your uterus and fallopian tubes  This can make it more difficult to get pregnant  BV increases your risk for other sexually transmitted infections (STIs), such as chlamydia, gonorrhea, or HIV  How can I prevent bacterial vaginosis? · Keep your vaginal area clean and dry:  Wear underwear and pantyhose with a cotton crotch  Wipe from front to back after you urinate or have a bowel movement  After bathing, rinse soap from your vaginal area to decrease your risk for irritation  · Do not use products that cause irritation:  Always use unscented tampons or sanitary pads   Do not use feminine sprays, powders, or scented tampons because they may cause irritation and increase your risk of BV  Detergents and fabric softeners may also cause irritation  · Do not douche: This can cause an imbalance in healthy vaginal bacteria  · Use latex condoms: This helps prevent another infection and keeps your partner from getting the infection  When should I contact my healthcare provider? · Your symptoms come back or do not improve with treatment  · You have vaginal bleeding that is not your monthly period  · You have questions or concerns about your condition or care  CARE AGREEMENT:   You have the right to help plan your care  Learn about your health condition and how it may be treated  Discuss treatment options with your caregivers to decide what care you want to receive  You always have the right to refuse treatment  The above information is an  only  It is not intended as medical advice for individual conditions or treatments  Talk to your doctor, nurse or pharmacist before following any medical regimen to see if it is safe and effective for you  © 2017 [employer] Information is for End User's use only and may not be sold, redistributed or otherwise used for commercial purposes  All illustrations and images included in CareNotes® are the copyrighted property of [employer]  or [employer]  Metronidazole (By mouth)   Metronidazole (met-liberty-OLMAN-da-zole)  Treats bacterial infections  Brand Name(s): Flagyl, Flagyl 375   There may be other brand names for this medicine  When This Medicine Should Not Be Used: This medicine is not right for everyone  Do not use if you had an allergic reaction to metronidazole or similar medicines  Do not use this medicine to treat trichomoniasis if you are in the first 3 months of pregnancy    How to Use This Medicine:   Capsule, Tablet, Long Acting Tablet  · Take this medicine as directed, and take it at the same time each day   · Capsule or Tablet: Take with food or milk to avoid stomach upset  · Extended-release tablet: Take it on an empty stomach, 1 hour before or 2 hours after a meal   · Swallow the extended-release tablet whole  Do not crush, break, or chew it  · Take all of the medicine in your prescription to clear up your infection, even if you feel better after the first few doses  · Missed dose: Take a dose as soon as you remember  If it is almost time for your next dose, wait until then and take a regular dose  Do not take extra medicine to make up for a missed dose  · Store the medicine in a closed container at room temperature, away from heat, moisture, and direct light  Drugs and Foods to Avoid:   Ask your doctor or pharmacist before using any other medicine, including over-the-counter medicines, vitamins, and herbal products  · Do not use this medicine if you have taken disulfiram within the last 2 weeks  Do not drink alcohol or use medicine that contains alcohol or propylene glycol while using this medicine and for at least 3 days after you have finished metronidazole treatment  · Some foods and medicines can affect how metronidazole works  Tell your doctor if you are using busulfan, cimetidine, lithium, phenobarbital, phenytoin, or warfarin or another blood thinner  Warnings While Using This Medicine:   · Tell your doctor if you are pregnant or breastfeeding, or if you have kidney disease, liver disease, blood or bone marrow problems, oral thrush, yeast infection, or a history of seizures  · This medicine may cause the following problems:  ¨ Brain or nervous system problems, including seizures, meningitis, or vision problems  · Trichomoniasis treatment:  Your doctor may want to also treat your sexual partner, even if he or she has no symptoms  Also, you may want to use a condom during sexual intercourse  These measures will help keep you from getting the infection back again from your partner   If you have any questions, ask your doctor  · Call your doctor if your symptoms do not improve or if they get worse  · Your doctor will do lab tests at regular visits to check on the effects of this medicine  Keep all appointments  · Tell any doctor or dentist who treats you that you are using this medicine  This medicine may affect certain medical test results  · Keep all medicine out of the reach of children  Never share your medicine with anyone  Possible Side Effects While Using This Medicine:   Call your doctor right away if you notice any of these side effects:  · Allergic reaction: Itching or hives, swelling in your face or hands, swelling or tingling in your mouth or throat, chest tightness, trouble breathing  · Confusion, drowsiness, fever, headache, loss of appetite, nausea or vomiting, stiff neck or back  · Dizziness, problems with muscle control, clumsiness, shakiness, trouble talking  · Fever, chills, cough, sore throat, and body aches  · Numbness, tingling, or burning pain in your hands, arms, legs, or feet  · Seizures  If you notice these less serious side effects, talk with your doctor:   · Mild nausea  · Unusual or unpleasant taste in your mouth  If you notice other side effects that you think are caused by this medicine, tell your doctor  Call your doctor for medical advice about side effects  You may report side effects to FDA at 9-987-FDA-5919  © 2017 [employer] Information is for End User's use only and may not be sold, redistributed or otherwise used for commercial purposes  The above information is an  only  It is not intended as medical advice for individual conditions or treatments  Talk to your doctor, nurse or pharmacist before following any medical regimen to see if it is safe and effective for you

## 2019-02-21 NOTE — PROGRESS NOTES
Assessment/Plan:      Diagnoses and all orders for this visit:    IUD contraception    BV (bacterial vaginosis)  -     metroNIDAZOLE (FLAGYL) 500 mg tablet; Take 1 tablet (500 mg total) by mouth every 12 (twelve) hours for 7 days    Other orders  -     intrauterine copper (PARAGARD) IUD; 1 Device by Intrauterine route continuous      -reviewed with patient removal of device may not resolve symptoms of back pain and vaginal bleeding  Encouraged patient to treat infection 1st   Patient is agreeable  -reviewed bacterial vaginosis and metronidazole  Written information provided  -reviewed with patient she is due for annual exam with Pap smear  -POC-treat infection than annual exam   Patient agreeable to give 3-4 months after infection is treated to see if pain and abnormal bleeding have resolved  If ParaGard IUD is removed plans to use condoms for contraception    RTO annual exam     Subjective:     Patient ID: Karoline Cronin is a 20 y o  female  HPI    here with desire for ParaGard IUD removal   Believes ParaGard IUD is causing low back pain and vaginal bleeding  Patient complains of low back pain starting last month noticed this months menses was prolonged 8-10 days, typically 5-6 days  Patient also states she has had vaginal discharge, odor for approximately 3 months  Patient believes this is BV as she has had this in the past   Has not been treated  Patient recently seen in emergency department [facility] had pelvic ultrasound at that time IUD is in appropriate paced  Patient states she was satisfied prior to recent back pain and bleeding this month  Patient states had STI testing last month  Last Pap smear 10/2/15- [facility]    Review of Systems   Constitutional: Negative for chills and fever  Respiratory: Negative  Cardiovascular: Negative  Gastrointestinal: Negative  Genitourinary: Positive for menstrual problem, pelvic pain and vaginal discharge   Negative for decreased urine volume, difficulty urinating, dysuria, flank pain, frequency, hematuria and vaginal pain  Objective:     Physical Exam   Constitutional: She is oriented to person, place, and time  She appears well-developed and well-nourished  Cardiovascular: Normal rate, regular rhythm and normal heart sounds  Pulmonary/Chest: Effort normal and breath sounds normal    Abdominal: Soft  Bowel sounds are normal    Genitourinary: No labial fusion  There is no rash, tenderness, lesion or injury on the right labia  There is no rash, tenderness, lesion or injury on the left labia  No erythema, tenderness or bleeding in the vagina  No foreign body in the vagina  No signs of injury around the vagina  Genitourinary Comments: ParaGard IUD strings easily visualized on speculum exam approximately 4 cm   Neurological: She is alert and oriented to person, place, and time  Skin: Skin is warm and dry

## 2019-05-10 ENCOUNTER — OFFICE VISIT (OUTPATIENT)
Dept: OBGYN CLINIC | Facility: CLINIC | Age: 24
End: 2019-05-10

## 2019-05-10 VITALS
SYSTOLIC BLOOD PRESSURE: 135 MMHG | BODY MASS INDEX: 31.41 KG/M2 | HEIGHT: 64 IN | DIASTOLIC BLOOD PRESSURE: 83 MMHG | WEIGHT: 184 LBS | HEART RATE: 90 BPM

## 2019-05-10 DIAGNOSIS — B96.89 BV (BACTERIAL VAGINOSIS): Primary | ICD-10-CM

## 2019-05-10 DIAGNOSIS — Z97.5 IUD CONTRACEPTION: ICD-10-CM

## 2019-05-10 DIAGNOSIS — N89.8 VAGINAL DISCHARGE: ICD-10-CM

## 2019-05-10 DIAGNOSIS — N76.0 BV (BACTERIAL VAGINOSIS): Primary | ICD-10-CM

## 2019-05-10 PROCEDURE — 87210 SMEAR WET MOUNT SALINE/INK: CPT | Performed by: NURSE PRACTITIONER

## 2019-05-10 PROCEDURE — 99213 OFFICE O/P EST LOW 20 MIN: CPT | Performed by: NURSE PRACTITIONER

## 2019-05-10 RX ORDER — METRONIDAZOLE 500 MG/1
500 TABLET ORAL EVERY 12 HOURS SCHEDULED
Qty: 14 TABLET | Refills: 1 | Status: SHIPPED | OUTPATIENT
Start: 2019-05-10 | End: 2019-05-17

## 2019-08-16 ENCOUNTER — OFFICE VISIT (OUTPATIENT)
Dept: URGENT CARE | Age: 24
End: 2019-08-16
Payer: COMMERCIAL

## 2019-08-16 DIAGNOSIS — Z02.1 PRE-EMPLOYMENT EXAMINATION: Primary | ICD-10-CM

## 2019-08-16 PROCEDURE — G0382 LEV 3 HOSP TYPE B ED VISIT: HCPCS | Performed by: NURSE PRACTITIONER

## 2019-08-16 NOTE — PROGRESS NOTES
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  She reports Hx of Johanne parkinson White syndrome  Last checked by cardiologist 3 yrs ago, while she was pregnant,  and says all exams were normal  No on any meds  No current s/s  Review of Systems   Review of Systems   Constitutional: Negative for chills and fever  HENT: Negative for congestion, hearing loss, sinus pressure and trouble swallowing  Eyes: Negative for visual disturbance  Respiratory: Negative for chest tightness, shortness of breath and wheezing  Cardiovascular: Negative for chest pain  Gastrointestinal: Negative for abdominal pain, diarrhea and vomiting  Genitourinary: Negative for difficulty urinating, dysuria and frequency  Musculoskeletal: Negative for back pain, myalgias and neck stiffness  Skin: Negative for rash  Neurological: Negative for dizziness, seizures, weakness and headaches  Psychiatric/Behavioral: Negative for behavioral problems and hallucinations  The patient is not nervous/anxious  Current Medications       Current Outpatient Medications:     Coconut Oil 1000 MG CAPS, Take 1 tablet by mouth daily  , Disp: , Rfl:     intrauterine copper (PARAGARD) IUD, 1 Device by Intrauterine route continuous, Disp: , Rfl:     Probiotic Product (PROBIOTIC DAILY PO), Take 1 tablet by mouth daily  , Disp: , Rfl:     Current Allergies Allergies as of 08/16/2019    (No Known Allergies)            The following portions of the patient's history were reviewed and updated as appropriate: allergies, current medications, past family history, past medical history, past social history, past surgical history and problem list      Past Medical History:   Diagnosis Date    Johanne-Parkinson-White (WPW) syndrome        Past Surgical History:   Procedure Laterality Date    VAGINAL DELIVERY         Family History   Problem Relation Age of Onset    Diabetes Mother     Hypothyroidism Mother    Lobito Page HIV Mother     Fibromyalgia Mother     Mental illness Mother     Mental illness Sister     Diabetes Brother     Lupus Maternal Aunt     Diabetes Maternal Grandmother          Medications have been verified  Objective   There were no vitals taken for this visit  Physical Exam     Physical Exam   Constitutional: She is oriented to person, place, and time  She appears well-developed  HENT:   Right Ear: External ear normal    Left Ear: External ear normal    Eyes: Pupils are equal, round, and reactive to light  Conjunctivae and EOM are normal    Neck: Normal range of motion  Neck supple  Cardiovascular: Normal rate, regular rhythm and normal heart sounds  No murmur heard  Pulmonary/Chest: Effort normal and breath sounds normal  She has no wheezes  Abdominal: Soft  Bowel sounds are normal  There is no tenderness  There is no rebound  Musculoskeletal: Normal range of motion  She exhibits no edema or deformity  Lymphadenopathy:     She has no cervical adenopathy  Neurological: She is oriented to person, place, and time  Skin: Skin is warm  Psychiatric: She has a normal mood and affect   Her behavior is normal  Judgment and thought content normal

## 2019-08-16 NOTE — PATIENT INSTRUCTIONS

## 2020-03-20 ENCOUNTER — TELEPHONE (OUTPATIENT)
Dept: OBGYN CLINIC | Facility: CLINIC | Age: 25
End: 2020-03-20

## 2020-03-20 DIAGNOSIS — B96.89 BV (BACTERIAL VAGINOSIS): Primary | ICD-10-CM

## 2020-03-20 DIAGNOSIS — N76.0 BV (BACTERIAL VAGINOSIS): Primary | ICD-10-CM

## 2020-03-20 RX ORDER — METRONIDAZOLE 500 MG/1
500 TABLET ORAL EVERY 12 HOURS SCHEDULED
Qty: 14 TABLET | Refills: 0 | Status: SHIPPED | OUTPATIENT
Start: 2020-03-20 | End: 2020-03-27

## 2020-03-20 NOTE — TELEPHONE ENCOUNTER
Pt was a "no show" for today's annual   Pt is complaining of strong odor and very minimal yellowish discharge  Pt stated she gets recurrent BV infections, last treated herself in January 2020 with a refill that was provided at her pharmacy  Pt would like to know if a refill could be sent until she returns for her annual and possible Paragard removal  Pt wants her IUD removed

## 2020-04-29 ENCOUNTER — OFFICE VISIT (OUTPATIENT)
Dept: OBGYN CLINIC | Facility: CLINIC | Age: 25
End: 2020-04-29

## 2020-04-29 VITALS
DIASTOLIC BLOOD PRESSURE: 80 MMHG | HEIGHT: 64 IN | HEART RATE: 114 BPM | TEMPERATURE: 97.3 F | WEIGHT: 179 LBS | SYSTOLIC BLOOD PRESSURE: 125 MMHG | BODY MASS INDEX: 30.56 KG/M2

## 2020-04-29 DIAGNOSIS — B96.89 BV (BACTERIAL VAGINOSIS): ICD-10-CM

## 2020-04-29 DIAGNOSIS — N89.8 VAGINAL DISCHARGE: Primary | ICD-10-CM

## 2020-04-29 DIAGNOSIS — N76.0 BV (BACTERIAL VAGINOSIS): ICD-10-CM

## 2020-04-29 DIAGNOSIS — Z30.432 ENCOUNTER FOR IUD REMOVAL: ICD-10-CM

## 2020-04-29 DIAGNOSIS — Z72.51 HIGH RISK HETEROSEXUAL BEHAVIOR: ICD-10-CM

## 2020-04-29 PROBLEM — Z97.5 IUD CONTRACEPTION: Status: RESOLVED | Noted: 2019-02-21 | Resolved: 2020-04-29

## 2020-04-29 LAB
BV WHIFF TEST VAG QL: POSITIVE
CLUE CELLS SPEC QL WET PREP: ABNORMAL
PH SMN: 5.5 [PH]
SL AMB POCT WET MOUNT: POSITIVE
T VAGINALIS VAG QL WET PREP: 0
YEAST VAG QL WET PREP: NEGATIVE

## 2020-04-29 PROCEDURE — 58301 REMOVE INTRAUTERINE DEVICE: CPT | Performed by: NURSE PRACTITIONER

## 2020-04-29 PROCEDURE — 87591 N.GONORRHOEAE DNA AMP PROB: CPT | Performed by: NURSE PRACTITIONER

## 2020-04-29 PROCEDURE — 99213 OFFICE O/P EST LOW 20 MIN: CPT | Performed by: NURSE PRACTITIONER

## 2020-04-29 PROCEDURE — 87210 SMEAR WET MOUNT SALINE/INK: CPT | Performed by: NURSE PRACTITIONER

## 2020-04-29 PROCEDURE — 87491 CHLMYD TRACH DNA AMP PROBE: CPT | Performed by: NURSE PRACTITIONER

## 2020-04-29 RX ORDER — METRONIDAZOLE 500 MG/1
500 TABLET ORAL EVERY 12 HOURS SCHEDULED
Qty: 14 TABLET | Refills: 0 | Status: SHIPPED | OUTPATIENT
Start: 2020-04-29 | End: 2020-05-06

## 2020-04-30 LAB
C TRACH DNA SPEC QL NAA+PROBE: NEGATIVE
N GONORRHOEA DNA SPEC QL NAA+PROBE: NEGATIVE

## 2020-08-13 ENCOUNTER — TELEPHONE (OUTPATIENT)
Dept: PSYCHIATRY | Facility: CLINIC | Age: 25
End: 2020-08-13

## 2021-04-12 ENCOUNTER — TELEPHONE (OUTPATIENT)
Dept: OBGYN CLINIC | Facility: CLINIC | Age: 26
End: 2021-04-12

## 2023-03-23 ENCOUNTER — OFFICE VISIT (OUTPATIENT)
Dept: OBGYN CLINIC | Facility: CLINIC | Age: 28
End: 2023-03-23

## 2023-03-23 VITALS
HEIGHT: 63 IN | WEIGHT: 181.8 LBS | DIASTOLIC BLOOD PRESSURE: 82 MMHG | SYSTOLIC BLOOD PRESSURE: 116 MMHG | BODY MASS INDEX: 32.21 KG/M2

## 2023-03-23 DIAGNOSIS — Z01.419 ROUTINE GYNECOLOGICAL EXAMINATION: Primary | ICD-10-CM

## 2023-03-23 DIAGNOSIS — Z11.3 SCREENING FOR STDS (SEXUALLY TRANSMITTED DISEASES): ICD-10-CM

## 2023-03-23 NOTE — PROGRESS NOTES
Assessment   32 y o  Jono Ards presenting for annual exam      Plan   Diagnoses and all orders for this visit:    Routine gynecological examination  -     IGP, CtNg, rfx Aptima HPV ASCU    Normal findings on routine exam   Encouraged 150 min of exercise per week  Reviewed balanced diet  Multivitamin encouraged   Breast awareness/SBE encouraged     Screening for STDs (sexually transmitted diseases)  -     IGP, CtNg, rfx Aptima HPV ASCU  -     Hepatitis B surface antigen; Future  -     Hepatitis C antibody; Future  -     HIV 1/2 AG/AB w Reflex [facility] for 2 yr old and above; Future  -     RPR-Syphilis Screening (Total Syphilis IGG/IGM); Future    Will notify with STD testing  Consistent condom use recommended to prevent STDs and undesired pregnancy  To call office if desires hormonal contraceptive going forward  Pap done today   Mammo - baseline due at 34       RTO one year for yearly exam or sooner as needed  __________________________________________________________________    Subjective     Anatoly Rausch is a 32 y o  Jono Ards presenting for annual exam  She is without complaint and does want STD testing today  Aureliano Aguayo is new to this office  She denies significant gyn hx  She has a daughter who is 7  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  She exercises 3-5 times per week  SCREENING  Last Pap: 2015 NILM  Last Mammo: n/a  Last Colonoscopy: n/a       GYN  Periods are q 26-28, lasting 5 days  Light to moderate flow, tolerable  Dysmenorrhea: mild ovulatory pain reported  No pain premenstrually or throughout period  Cyclic symptoms include none    Sexually active: yes - not in the past 3 weeks - currently planning abstinence  She reports both male and female partners     Contraception: condoms - not consistent with this, and withdrawal   Previously on ParaGard IUD and vaginal ring - didn't like heavy painful bleeding with Paragard and moodiness on ring  Hx STI: denies     Hx Abnormal pap: denies  We reviewed ASCCP guidelines for Pap testing today  Gardasil: She believes she has completed the Gardasil series - but will check record    Denies vaginal discharge, itching, odor, dyspareunia, pelvic pain and vulvar/vaginal symptoms      OB         Complaints: denies   Denies urgency, frequency, hematuria, leakage / change in stream, difficulty urinating  BREAST  Complaints: denies   Denies: breast lump, breast tenderness, nipple discharge, skin color change, and skin lesion(s)  Personal hx: none       Pertinent Family Hx:   Family hx of breast cancer: no  Family hx of ovarian cancer: no  Family hx of colon cancer: no      GENERAL  PMH reviewed/updated and is as below  Patient does not follow with a PCP  Recommended to establish       Past Medical History:   Diagnosis Date   • Johanne-Parkinson-White (WPW) syndrome        Past Surgical History:   Procedure Laterality Date   • DENTAL SURGERY     • VAGINAL DELIVERY           Current Outpatient Medications:   •  Coconut Oil 1000 MG CAPS, Take 1 tablet by mouth daily  , Disp: , Rfl:   •  Probiotic Product (PROBIOTIC DAILY PO), Take 1 tablet by mouth daily  , Disp: , Rfl:     No Known Allergies    Social History     Socioeconomic History   • Marital status: Single     Spouse name: Not on file   • Number of children: Not on file   • Years of education: Not on file   • Highest education level: Not on file   Occupational History   • Not on file   Tobacco Use   • Smoking status: Former   • Smokeless tobacco: Never   Substance and Sexual Activity   • Alcohol use: Yes     Comment: socially   • Drug use: Yes     Types: Marijuana     Comment: everyday   • Sexual activity: Yes     Partners: Male     Birth control/protection: None   Other Topics Concern   • Not on file   Social History Narrative   • Not on file     Social Determinants of Health     Financial Resource Strain: Not on file Food Insecurity: Not on file   Transportation Needs: Not on file   Physical Activity: Not on file   Stress: Not on file   Social Connections: Not on file   Intimate Partner Violence: Not on file   Housing Stability: Not on file       Review of Systems     ROS:  Constitutional: Negative for fatigue and unexpected weight change  Respiratory: Negative for cough and shortness of breath  Cardiovascular: Negative for chest pain and palpitations  Gastrointestinal: Negative for abdominal pain and change in bowel habits  Breasts:  Negative, other than as noted above  Genitourinary: Negative, other than as noted above  Psychiatric: Negative for mood difficulties  Objective      /82 (BP Location: Left arm, Patient Position: Sitting, Cuff Size: Standard)   Ht 5' 3 25" (1 607 m)   Wt 82 5 kg (181 lb 12 8 oz)   LMP 03/08/2023   Breastfeeding No   BMI 31 95 kg/m²     Physical Examination:    Patient appears well and is not in distress  Neck is supple without masses  Breasts are symmetrical without mass, tenderness, nipple discharge, skin changes or adenopathy  Abdomen is soft and nontender without masses  External genitals are normal without lesions or rashes  Urethral meatus and urethra are normal  Bladder is normal to palpation  Vagina is normal without discharge or bleeding  Cervix is normal without discharge or lesion  Uterus is normal, mobile, nontender without palpable mass  Adnexa are normal, nontender, without palpable mass

## 2023-03-27 LAB
C TRACH RRNA CVX QL NAA+PROBE: NEGATIVE
CYTOLOGIST CVX/VAG CYTO: NORMAL
DX ICD CODE: NORMAL
N GONORRHOEA RRNA CVX QL NAA+PROBE: NEGATIVE
OTHER STN SPEC: NORMAL
OTHER STN SPEC: NORMAL
PATH REPORT.FINAL DX SPEC: NORMAL
SL AMB NOTE:: NORMAL
SL AMB SPECIMEN ADEQUACY: NORMAL
SL AMB TEST METHODOLOGY: NORMAL

## 2023-04-27 ENCOUNTER — TELEPHONE (OUTPATIENT)
Dept: OBGYN CLINIC | Facility: CLINIC | Age: 28
End: 2023-04-27

## 2023-04-27 NOTE — TELEPHONE ENCOUNTER
----- Message from Teresa Esparza PA-C sent at 4/26/2023  5:18 PM EDT -----  Will you please call Alex Maher to let her know her pap was normal  I sent a [employer] message but it has not been read       Thanks,  Papa Houser

## 2023-06-02 ENCOUNTER — OFFICE VISIT (OUTPATIENT)
Dept: FAMILY MEDICINE CLINIC | Facility: CLINIC | Age: 28
End: 2023-06-02

## 2023-06-02 VITALS
HEIGHT: 66 IN | RESPIRATION RATE: 16 BRPM | WEIGHT: 191 LBS | DIASTOLIC BLOOD PRESSURE: 73 MMHG | SYSTOLIC BLOOD PRESSURE: 112 MMHG | OXYGEN SATURATION: 100 % | HEART RATE: 56 BPM | TEMPERATURE: 97.8 F | BODY MASS INDEX: 30.7 KG/M2

## 2023-06-02 DIAGNOSIS — Z00.00 ANNUAL PHYSICAL EXAM: Primary | ICD-10-CM

## 2023-06-02 DIAGNOSIS — Z13.6 SCREENING FOR CARDIOVASCULAR CONDITION: ICD-10-CM

## 2023-06-02 DIAGNOSIS — E66.9 OBESITY (BMI 30.0-34.9): ICD-10-CM

## 2023-06-02 RX ORDER — SERTRALINE HYDROCHLORIDE 25 MG/1
12.5 TABLET, FILM COATED ORAL DAILY
COMMUNITY

## 2023-06-02 NOTE — PROGRESS NOTES
[address] [facility]    NAME: Daryle Pages [place]ut  AGE: 32 y o  SEX: female  : 1995     DATE: 2023     Assessment and Plan:     Problem List Items Addressed This Visit        Other    Obesity (BMI 30 0-34  9)     BMI Counseling: Body mass index is 31 3 kg/m²  The BMI is above normal  Nutrition recommendations include reducing portion sizes, decreasing overall calorie intake and increasing intake of lean protein  Exercise recommendations include moderate aerobic physical activity for 150 minutes/week, exercising 3-5 times per week and strength training exercises  Will r/o Thyroid dysfunction  TSH and A1c ordered  Relevant Orders    TSH, 3rd generation with Free T4 reflex    Hemoglobin A1C   Other Visit Diagnoses     Annual physical exam    -  Primary    Screening for cardiovascular condition        Relevant Orders    TSH, 3rd generation with Free T4 reflex    Hemoglobin A1C    Lipid panel          Immunizations and preventive care screenings were discussed with patient today  Appropriate education was printed on patient's after visit summary  Counseling:  Alcohol/drug use: discussed moderation in alcohol intake, the recommendations for healthy alcohol use, and avoidance of illicit drug use  Dental Health: discussed importance of regular tooth brushing, flossing, and dental visits  Injury prevention: discussed safety/seat belts, safety helmets, smoke detectors, carbon dioxide detectors, and smoking near bedding or upholstery  Sexual health: discussed sexually transmitted diseases, partner selection, use of condoms, avoidance of unintended pregnancy, and contraceptive alternatives  · Exercise: the importance of regular exercise/physical activity was discussed  Recommend exercise 3-5 times per week for at least 30 minutes  BMI Counseling: Body mass index is 31 3 kg/m²   The BMI is above normal  Nutrition recommendations include decreasing portion sizes, encouraging healthy choices of fruits and vegetables, limiting drinks that contain sugar and increasing intake of lean protein  Exercise recommendations include moderate physical activity 150 minutes/week, exercising 3-5 times per week and strength training exercises  Rationale for BMI follow-up plan is due to patient being overweight or obese  Depression Screening and Follow-up Plan: Patient was screened for depression during today's encounter  They screened negative with a PHQ-2 score of 2  Return in 1 year (on 2024) for Annual Physical      Chief Complaint:     Chief Complaint   Patient presents with   • Physical Exam      History of Present Illness:     Adult Annual Physical   Patient here for a comprehensive physical exam  The patient reports no problems  Diet and Physical Activity  · Diet/Nutrition: well balanced diet, limited junk food, consuming 3-5 servings of fruits/vegetables daily, adequate fiber intake and adequate whole grain intake  · Exercise: moderate cardiovascular exercise and 3-4 times a week on average  Depression Screening  PHQ-2/9 Depression Screening    Little interest or pleasure in doing things: 1 - several days  Feeling down, depressed, or hopeless: 1 - several days  Trouble falling or staying asleep, or sleeping too much: 1 - several days  Feeling tired or having little energy: 1 - several days  Poor appetite or overeatin - several days  Feeling bad about yourself - or that you are a failure or have let yourself or your family down: 1 - several days  Trouble concentrating on things, such as reading the newspaper or watching television: 1 - several days  Moving or speaking so slowly that other people could have noticed   Or the opposite - being so fidgety or restless that you have been moving around a lot more than usual: 1 - several days  Thoughts that you would be better off dead, or of hurting yourself in some way: 0 - not at all  PHQ-2 Score: 2  PHQ-2 Interpretation: Negative depression screen  PHQ-9 Score: 8   PHQ-9 Interpretation: Mild depression        General Health  · Sleep: sleeps well and gets 7-8 hours of sleep on average  · Hearing: normal - bilateral   · Vision: no vision problems and wears glasses  · Dental: brushes teeth twice daily and flosses teeth occasionally  /GYN Health  · Last menstrual period: unknown  · Contraceptive method: none  · History of STDs?: no      Review of Systems:     Review of Systems   Constitutional: Negative for activity change, appetite change, fatigue and fever  HENT: Negative for sore throat  Respiratory: Negative for cough, shortness of breath and wheezing  Cardiovascular: Negative for chest pain, palpitations and leg swelling  Gastrointestinal: Negative for abdominal pain, diarrhea, nausea and vomiting  Genitourinary: Negative for dysuria and pelvic pain  Skin: Negative for rash  Neurological: Negative for weakness and headaches        Past Medical History:     Past Medical History:   Diagnosis Date   • Johanne-Parkinson-White (WPW) syndrome       Past Surgical History:     Past Surgical History:   Procedure Laterality Date   • DENTAL SURGERY     • VAGINAL DELIVERY        Social History:     Social History     Socioeconomic History   • Marital status: Single     Spouse name: None   • Number of children: None   • Years of education: None   • Highest education level: None   Occupational History   • None   Tobacco Use   • Smoking status: Former   • Smokeless tobacco: Never   Substance and Sexual Activity   • Alcohol use: Yes     Comment: socially   • Drug use: Yes     Types: Marijuana     Comment: everyday   • Sexual activity: Yes     Partners: Male     Birth control/protection: None   Other Topics Concern   • None   Social History Narrative   • None     Social Determinants of Health     Financial Resource Strain: Medium Risk (6/2/2023) Overall Financial Resource Strain (CARDIA)    • Difficulty of Paying Living Expenses: Somewhat hard   Food Insecurity: Food Insecurity Present (6/2/2023)    Hunger Vital Sign    • Worried About Running Out of Food in the Last Year: Sometimes true    • Ran Out of Food in the Last Year: Sometimes true   Transportation Needs: No Transportation Needs (6/2/2023)    PRAPARE - Transportation    • Lack of Transportation (Medical): No    • Lack of Transportation (Non-Medical): No   Physical Activity: Sufficiently Active (6/2/2023)    Exercise Vital Sign    • Days of Exercise per Week: 5 days    • Minutes of Exercise per Session: 120 min   Stress: No Stress Concern Present (6/2/2023)    [address]    • Feeling of Stress : Only a little   Social Connections: Not on file   Intimate Partner Violence: Not At Risk (6/2/2023)    Humiliation, Afraid, Rape, and Kick questionnaire    • Fear of Current or Ex-Partner: No    • Emotionally Abused: No    • Physically Abused: No    • Sexually Abused: No   Housing Stability: Low Risk  (6/2/2023)    Housing Stability Vital Sign    • Unable to Pay for Housing in the Last Year: No    • Number of Places Lived in the Last Year: 1    • Unstable Housing in the Last Year: No      Family History:     Family History   Problem Relation Age of Onset   • Diabetes Mother    • Hypothyroidism Mother    • HIV Mother    • Fibromyalgia Mother    • Mental illness Mother    • Mental illness Sister    • Diabetes Brother    • Diabetes Maternal Grandmother    • Lupus Maternal Aunt       Current Medications:     Current Outpatient Medications   Medication Sig Dispense Refill   • sertraline (ZOLOFT) 25 mg tablet Take 12 5 mg by mouth daily     • Coconut Oil 1000 MG CAPS Take 1 tablet by mouth daily  (Patient not taking: Reported on 6/2/2023)     • Probiotic Product (PROBIOTIC DAILY PO) Take 1 tablet by mouth daily   (Patient not taking: Reported on "6/2/2023)       No current facility-administered medications for this visit  Allergies:     No Known Allergies   Physical Exam:     /73   Pulse 56   Temp 97 8 °F (36 6 °C)   Resp 16   Ht 5' 5 5\" (1 664 m)   Wt 86 6 kg (191 lb)   SpO2 100%   BMI 31 30 kg/m²     Physical Exam  Vitals reviewed  Constitutional:       General: She is not in acute distress  Appearance: Normal appearance  She is normal weight  She is not ill-appearing  HENT:      Head: Normocephalic and atraumatic  Right Ear: Tympanic membrane, ear canal and external ear normal  There is no impacted cerumen  Left Ear: Tympanic membrane, ear canal and external ear normal  There is no impacted cerumen  Nose: Nose normal  No congestion  Mouth/Throat:      Mouth: Mucous membranes are moist    Eyes:      General:         Right eye: No discharge  Left eye: No discharge  Extraocular Movements: Extraocular movements intact  Conjunctiva/sclera: Conjunctivae normal       Pupils: Pupils are equal, round, and reactive to light  Cardiovascular:      Rate and Rhythm: Normal rate and regular rhythm  Heart sounds: Normal heart sounds  Pulmonary:      Effort: Pulmonary effort is normal  No respiratory distress  Breath sounds: Normal breath sounds  Chest:      Chest wall: No tenderness  Abdominal:      General: Abdomen is flat  Bowel sounds are normal  There is no distension  Palpations: Abdomen is soft  There is no mass  Tenderness: There is no abdominal tenderness  Musculoskeletal:         General: No swelling, tenderness, deformity or signs of injury  Cervical back: Normal range of motion  Right lower leg: No edema  Left lower leg: No edema  Skin:     General: Skin is warm  Capillary Refill: Capillary refill takes less than 2 seconds  Findings: No rash  Neurological:      General: No focal deficit present        Mental Status: She is alert and " oriented to person, place, and time     Psychiatric:         Mood and Affect: Mood normal          Behavior: Behavior normal           Juan Carlos Watkins MD   [address]

## 2023-06-02 NOTE — ASSESSMENT & PLAN NOTE
BMI Counseling: Body mass index is 31 3 kg/m²  The BMI is above normal  Nutrition recommendations include reducing portion sizes, decreasing overall calorie intake and increasing intake of lean protein  Exercise recommendations include moderate aerobic physical activity for 150 minutes/week, exercising 3-5 times per week and strength training exercises  Will r/o Thyroid dysfunction  TSH and A1c ordered

## 2023-08-25 ENCOUNTER — APPOINTMENT (OUTPATIENT)
Dept: LAB | Facility: CLINIC | Age: 28
End: 2023-08-25
Payer: COMMERCIAL

## 2023-08-25 DIAGNOSIS — Z13.6 SCREENING FOR CARDIOVASCULAR CONDITION: ICD-10-CM

## 2023-08-25 DIAGNOSIS — Z11.3 SCREENING FOR STDS (SEXUALLY TRANSMITTED DISEASES): ICD-10-CM

## 2023-08-25 DIAGNOSIS — E66.9 OBESITY (BMI 30.0-34.9): ICD-10-CM

## 2023-08-25 LAB
CHOLEST SERPL-MCNC: 181 MG/DL
EST. AVERAGE GLUCOSE BLD GHB EST-MCNC: 103 MG/DL
HBA1C MFR BLD: 5.2 %
HBV SURFACE AG SER QL: NORMAL
HCV AB SER QL: NORMAL
HDLC SERPL-MCNC: 83 MG/DL
HIV 1+2 AB+HIV1 P24 AG SERPL QL IA: NORMAL
HIV 2 AB SERPL QL IA: NORMAL
HIV1 AB SERPL QL IA: NORMAL
HIV1 P24 AG SERPL QL IA: NORMAL
LDLC SERPL CALC-MCNC: 89 MG/DL (ref 0–100)
NONHDLC SERPL-MCNC: 98 MG/DL
TREPONEMA PALLIDUM IGG+IGM AB [PRESENCE] IN SERUM OR PLASMA BY IMMUNOASSAY: NORMAL
TRIGL SERPL-MCNC: 44 MG/DL
TSH SERPL DL<=0.05 MIU/L-ACNC: 1.72 UIU/ML (ref 0.45–4.5)

## 2023-08-25 PROCEDURE — 84443 ASSAY THYROID STIM HORMONE: CPT

## 2023-08-25 PROCEDURE — 87340 HEPATITIS B SURFACE AG IA: CPT

## 2023-08-25 PROCEDURE — 36415 COLL VENOUS BLD VENIPUNCTURE: CPT

## 2023-08-25 PROCEDURE — 86803 HEPATITIS C AB TEST: CPT

## 2023-08-25 PROCEDURE — 80061 LIPID PANEL: CPT

## 2023-08-25 PROCEDURE — 86780 TREPONEMA PALLIDUM: CPT

## 2023-08-25 PROCEDURE — 83036 HEMOGLOBIN GLYCOSYLATED A1C: CPT

## 2023-08-25 PROCEDURE — 87389 HIV-1 AG W/HIV-1&-2 AB AG IA: CPT

## 2023-09-05 ENCOUNTER — TELEPHONE (OUTPATIENT)
Dept: OBGYN CLINIC | Facility: CLINIC | Age: 28
End: 2023-09-05

## 2023-09-05 NOTE — TELEPHONE ENCOUNTER
----- Message from Deborah Aquino PA-C sent at 9/5/2023  8:09 AM EDT -----  Will you please call Pooja Erickson to let her know her testing for Hep B, Hep C, HIV, and syphilis were neg. I sent a [employer] message but it has not been read.      Thanks,  Danitza Lee

## 2024-03-06 ENCOUNTER — APPOINTMENT (OUTPATIENT)
Dept: LAB | Age: 29
End: 2024-03-06
Payer: COMMERCIAL

## 2024-03-06 ENCOUNTER — OFFICE VISIT (OUTPATIENT)
Dept: FAMILY MEDICINE CLINIC | Facility: CLINIC | Age: 29
End: 2024-03-06

## 2024-03-06 ENCOUNTER — APPOINTMENT (OUTPATIENT)
Dept: LAB | Facility: CLINIC | Age: 29
End: 2024-03-06
Payer: COMMERCIAL

## 2024-03-06 VITALS
RESPIRATION RATE: 18 BRPM | DIASTOLIC BLOOD PRESSURE: 73 MMHG | OXYGEN SATURATION: 98 % | SYSTOLIC BLOOD PRESSURE: 110 MMHG | TEMPERATURE: 98.3 F | BODY MASS INDEX: 31.15 KG/M2 | HEART RATE: 74 BPM | HEIGHT: 66 IN | WEIGHT: 193.8 LBS

## 2024-03-06 DIAGNOSIS — Z11.1 SCREENING-PULMONARY TB: ICD-10-CM

## 2024-03-06 DIAGNOSIS — Z00.00 ANNUAL PHYSICAL EXAM: ICD-10-CM

## 2024-03-06 DIAGNOSIS — Z11.1 SCREENING-PULMONARY TB: Primary | ICD-10-CM

## 2024-03-06 PROCEDURE — 86480 TB TEST CELL IMMUN MEASURE: CPT

## 2024-03-06 PROCEDURE — 36415 COLL VENOUS BLD VENIPUNCTURE: CPT

## 2024-03-06 PROCEDURE — 99395 PREV VISIT EST AGE 18-39: CPT | Performed by: FAMILY MEDICINE

## 2024-03-06 RX ORDER — BUSPIRONE HYDROCHLORIDE 7.5 MG/1
7.5 TABLET ORAL 2 TIMES DAILY
COMMUNITY

## 2024-03-06 NOTE — PROGRESS NOTES
ADULT ANNUAL PHYSICAL  [facility] [place]    NAME: Hany Rausch  AGE: 28 y.o. SEX: female  : 1995     DATE: 3/6/2024     Assessment and Plan:     Problem List Items Addressed This Visit    None  Visit Diagnoses       Screening-pulmonary TB    -  Primary    Relevant Orders    QuantiFERON-TB Gold Plus [facility]    Annual physical exam                Immunizations and preventive care screenings were discussed with patient today. Appropriate education was printed on patient's after visit summary.    Counseling:  Alcohol/drug use: discussed moderation in alcohol intake, the recommendations for healthy alcohol use, and avoidance of illicit drug use.  Dental Health: discussed importance of regular tooth brushing, flossing, and dental visits.  Sexual health: discussed sexually transmitted diseases, partner selection, use of condoms, avoidance of unintended pregnancy, and contraceptive alternatives.  Exercise: the importance of regular exercise/physical activity was discussed. Recommend exercise 3-5 times per week for at least 30 minutes.          Return in 1 year (on 3/6/2025).     Chief Complaint:     Chief Complaint   Patient presents with    Physical Exam     Patient wants blood test for PPD      History of Present Illness:     Adult Annual Physical   Patient here for a comprehensive physical exam. The patient reports no problems.    Diet and Physical Activity  Diet/Nutrition: well balanced diet.   Exercise: 3-4 times a week on average.      Depression Screening  PHQ-2/9 Depression Screening    Little interest or pleasure in doing things: 0 - not at all  Feeling down, depressed, or hopeless: 0 - not at all  PHQ-2 Score: 0  PHQ-2 Interpretation: Negative depression screen       General Health  Sleep: sleeps well.   Hearing: normal - bilateral.  Vision: no vision problems.   Dental: no dental visits for >1 year.       /GYN  Health  Follows with gynecology? yes   Last menstrual period: 2/15/2024  Contraceptive method:  none .  History of STDs?: no.         Review of Systems:     Review of Systems   Constitutional:  Negative for chills and fever.   HENT:  Negative for ear pain and sore throat.    Eyes:  Negative for pain and visual disturbance.   Respiratory:  Negative for cough and shortness of breath.    Cardiovascular:  Negative for chest pain and palpitations.   Gastrointestinal:  Negative for abdominal pain and vomiting.   Genitourinary:  Negative for dysuria and hematuria.   Musculoskeletal:  Negative for arthralgias and back pain.   Skin:  Negative for color change and rash.   Neurological:  Negative for seizures and syncope.   All other systems reviewed and are negative.     Past Medical History:     Past Medical History:   Diagnosis Date    Johanne-Parkinson-White (WPW) syndrome       Past Surgical History:     Past Surgical History:   Procedure Laterality Date    DENTAL SURGERY      VAGINAL DELIVERY        Social History:     Social History     Socioeconomic History    Marital status: Single     Spouse name: None    Number of children: None    Years of education: None    Highest education level: None   Occupational History    None   Tobacco Use    Smoking status: Former    Smokeless tobacco: Never   Substance and Sexual Activity    Alcohol use: Yes     Comment: socially    Drug use: Yes     Types: Marijuana     Comment: everyday    Sexual activity: Yes     Partners: Male     Birth control/protection: None   Other Topics Concern    None   Social History Narrative    None     Social Determinants of Health     Financial Resource Strain: Low Risk  (3/6/2024)    Overall Financial Resource Strain (CARDIA)     Difficulty of Paying Living Expenses: Not hard at all   Food Insecurity: No Food Insecurity (3/6/2024)    Hunger Vital Sign     Worried About Running Out of Food in the Last Year: Never true     Ran Out of Food in the Last Year:  Never true   Transportation Needs: No Transportation Needs (3/6/2024)    PRAPARE - Transportation     Lack of Transportation (Medical): No     Lack of Transportation (Non-Medical): No   Physical Activity: Sufficiently Active (3/6/2024)    Exercise Vital Sign     Days of Exercise per Week: 4 days     Minutes of Exercise per Session: 50 min   Stress: No Stress Concern Present (3/6/2024)    [employer] - Occupational Stress Questionnaire     Feeling of Stress : Not at all   Social Connections: Not on file   Intimate Partner Violence: Not At Risk (3/6/2024)    Humiliation, Afraid, Rape, and Kick questionnaire     Fear of Current or Ex-Partner: No     Emotionally Abused: No     Physically Abused: No     Sexually Abused: No   Housing Stability: Low Risk  (3/6/2024)    Housing Stability Vital Sign     Unable to Pay for Housing in the Last Year: No     Number of Places Lived in the Last Year: 1     Unstable Housing in the Last Year: No      Family History:     Family History   Problem Relation Age of Onset    Diabetes Mother     Hypothyroidism Mother     HIV Mother     Fibromyalgia Mother     Mental illness Mother     Mental illness Sister     Diabetes Brother     Diabetes Maternal Grandmother     Lupus Maternal Aunt       Current Medications:     Current Outpatient Medications   Medication Sig Dispense Refill    busPIRone (BUSPAR) 7.5 mg tablet Take 7.5 mg by mouth 2 (two) times a day      sertraline (ZOLOFT) 25 mg tablet Take 12.5 mg by mouth daily      Coconut Oil 1000 MG CAPS Take 1 tablet by mouth daily. (Patient not taking: Reported on 6/2/2023)      Probiotic Product (PROBIOTIC DAILY PO) Take 1 tablet by mouth daily. (Patient not taking: Reported on 6/2/2023)       No current facility-administered medications for this visit.      Allergies:     No Known Allergies   Physical Exam:     /73 (BP Location: Left arm, Patient Position: Sitting, Cuff Size: Large)   Pulse 74   Temp 98.3 °F  "(36.8 °C) (Temporal)   Resp 18   Ht 5' 5.5\" (1.664 m)   Wt 87.9 kg (193 lb 12.8 oz)   SpO2 98%   BMI 31.76 kg/m²     Physical Exam  Vitals and nursing note reviewed.   Constitutional:       General: She is not in acute distress.     Appearance: She is well-developed.   HENT:      Head: Normocephalic and atraumatic.   Eyes:      Conjunctiva/sclera: Conjunctivae normal.   Cardiovascular:      Rate and Rhythm: Normal rate and regular rhythm.      Heart sounds: No murmur heard.  Pulmonary:      Effort: Pulmonary effort is normal. No respiratory distress.      Breath sounds: Normal breath sounds. No stridor. No wheezing, rhonchi or rales.   Abdominal:      Palpations: Abdomen is soft.      Tenderness: There is no abdominal tenderness.   Musculoskeletal:         General: No swelling.      Cervical back: Neck supple.   Skin:     General: Skin is warm and dry.      Capillary Refill: Capillary refill takes less than 2 seconds.   Neurological:      Mental Status: She is alert.   Psychiatric:         Mood and Affect: Mood normal.          Helen Mireles MD   [facility]    "

## 2024-03-07 LAB
GAMMA INTERFERON BACKGROUND BLD IA-ACNC: 0.08 IU/ML
M TB IFN-G BLD-IMP: NEGATIVE
M TB IFN-G CD4+ BCKGRND COR BLD-ACNC: -0.02 IU/ML
M TB IFN-G CD4+ BCKGRND COR BLD-ACNC: 0 IU/ML
MITOGEN IGNF BCKGRD COR BLD-ACNC: 9.92 IU/ML